# Patient Record
Sex: FEMALE | Race: OTHER
[De-identification: names, ages, dates, MRNs, and addresses within clinical notes are randomized per-mention and may not be internally consistent; named-entity substitution may affect disease eponyms.]

---

## 2017-08-22 ENCOUNTER — APPOINTMENT (OUTPATIENT)
Dept: ANTEPARTUM | Facility: CLINIC | Age: 27
End: 2017-08-22

## 2017-08-22 PROBLEM — Z00.00 ENCOUNTER FOR PREVENTIVE HEALTH EXAMINATION: Status: ACTIVE | Noted: 2017-08-22

## 2019-04-03 ENCOUNTER — ASOB RESULT (OUTPATIENT)
Age: 29
End: 2019-04-03

## 2019-04-03 ENCOUNTER — APPOINTMENT (OUTPATIENT)
Age: 29
End: 2019-04-03
Payer: COMMERCIAL

## 2019-04-03 PROCEDURE — 76830 TRANSVAGINAL US NON-OB: CPT

## 2019-04-03 PROCEDURE — 76857 US EXAM PELVIC LIMITED: CPT | Mod: 59

## 2021-01-19 ENCOUNTER — ASOB RESULT (OUTPATIENT)
Age: 31
End: 2021-01-19

## 2021-01-19 ENCOUNTER — APPOINTMENT (OUTPATIENT)
Dept: ANTEPARTUM | Facility: CLINIC | Age: 31
End: 2021-01-19
Payer: COMMERCIAL

## 2021-01-19 PROCEDURE — 76856 US EXAM PELVIC COMPLETE: CPT | Mod: 59

## 2021-01-19 PROCEDURE — 99072 ADDL SUPL MATRL&STAF TM PHE: CPT

## 2021-01-19 PROCEDURE — 76830 TRANSVAGINAL US NON-OB: CPT

## 2021-03-09 ENCOUNTER — APPOINTMENT (OUTPATIENT)
Dept: ANTEPARTUM | Facility: CLINIC | Age: 31
End: 2021-03-09
Payer: COMMERCIAL

## 2021-03-09 ENCOUNTER — ASOB RESULT (OUTPATIENT)
Age: 31
End: 2021-03-09

## 2021-03-09 PROCEDURE — 76856 US EXAM PELVIC COMPLETE: CPT | Mod: 59

## 2021-03-09 PROCEDURE — 99072 ADDL SUPL MATRL&STAF TM PHE: CPT

## 2021-03-09 PROCEDURE — 76830 TRANSVAGINAL US NON-OB: CPT

## 2021-08-12 ENCOUNTER — EMERGENCY (EMERGENCY)
Facility: HOSPITAL | Age: 31
LOS: 0 days | Discharge: HOME | End: 2021-08-12
Attending: HOSPITALIST
Payer: COMMERCIAL

## 2021-08-12 VITALS
HEART RATE: 84 BPM | RESPIRATION RATE: 20 BRPM | OXYGEN SATURATION: 98 % | TEMPERATURE: 99 F | SYSTOLIC BLOOD PRESSURE: 118 MMHG | DIASTOLIC BLOOD PRESSURE: 68 MMHG

## 2021-08-12 VITALS
SYSTOLIC BLOOD PRESSURE: 120 MMHG | DIASTOLIC BLOOD PRESSURE: 72 MMHG | HEART RATE: 84 BPM | OXYGEN SATURATION: 97 % | RESPIRATION RATE: 18 BRPM | TEMPERATURE: 99 F

## 2021-08-12 DIAGNOSIS — Z88.8 ALLERGY STATUS TO OTHER DRUGS, MEDICAMENTS AND BIOLOGICAL SUBSTANCES STATUS: ICD-10-CM

## 2021-08-12 DIAGNOSIS — O20.9 HEMORRHAGE IN EARLY PREGNANCY, UNSPECIFIED: ICD-10-CM

## 2021-08-12 DIAGNOSIS — Z3A.08 8 WEEKS GESTATION OF PREGNANCY: ICD-10-CM

## 2021-08-12 DIAGNOSIS — O20.0 THREATENED ABORTION: ICD-10-CM

## 2021-08-12 LAB
ALBUMIN SERPL ELPH-MCNC: 3 G/DL — LOW (ref 3.3–5)
ALP SERPL-CCNC: 53 U/L — SIGNIFICANT CHANGE UP (ref 40–120)
ALT FLD-CCNC: 19 U/L — SIGNIFICANT CHANGE UP (ref 12–78)
ANION GAP SERPL CALC-SCNC: 6 MMOL/L — SIGNIFICANT CHANGE UP (ref 5–17)
APPEARANCE UR: CLEAR — SIGNIFICANT CHANGE UP
AST SERPL-CCNC: 18 U/L — SIGNIFICANT CHANGE UP (ref 15–37)
BASOPHILS # BLD AUTO: 0.05 K/UL — SIGNIFICANT CHANGE UP (ref 0–0.2)
BASOPHILS NFR BLD AUTO: 0.4 % — SIGNIFICANT CHANGE UP (ref 0–2)
BILIRUB SERPL-MCNC: 0.2 MG/DL — SIGNIFICANT CHANGE UP (ref 0.2–1.2)
BILIRUB UR-MCNC: NEGATIVE — SIGNIFICANT CHANGE UP
BUN SERPL-MCNC: 10 MG/DL — SIGNIFICANT CHANGE UP (ref 7–23)
CALCIUM SERPL-MCNC: 8.9 MG/DL — SIGNIFICANT CHANGE UP (ref 8.5–10.1)
CHLORIDE SERPL-SCNC: 107 MMOL/L — SIGNIFICANT CHANGE UP (ref 96–108)
CO2 SERPL-SCNC: 23 MMOL/L — SIGNIFICANT CHANGE UP (ref 22–31)
COLOR SPEC: YELLOW — SIGNIFICANT CHANGE UP
CREAT SERPL-MCNC: 0.59 MG/DL — SIGNIFICANT CHANGE UP (ref 0.5–1.3)
DIFF PNL FLD: ABNORMAL
EOSINOPHIL # BLD AUTO: 0.12 K/UL — SIGNIFICANT CHANGE UP (ref 0–0.5)
EOSINOPHIL NFR BLD AUTO: 1 % — SIGNIFICANT CHANGE UP (ref 0–6)
GLUCOSE SERPL-MCNC: 83 MG/DL — SIGNIFICANT CHANGE UP (ref 70–99)
GLUCOSE UR QL: NEGATIVE MG/DL — SIGNIFICANT CHANGE UP
HCG SERPL-ACNC: HIGH MIU/ML
HCT VFR BLD CALC: 39.4 % — SIGNIFICANT CHANGE UP (ref 34.5–45)
HGB BLD-MCNC: 12.8 G/DL — SIGNIFICANT CHANGE UP (ref 11.5–15.5)
IMM GRANULOCYTES NFR BLD AUTO: 0.6 % — SIGNIFICANT CHANGE UP (ref 0–1.5)
KETONES UR-MCNC: NEGATIVE — SIGNIFICANT CHANGE UP
LEUKOCYTE ESTERASE UR-ACNC: ABNORMAL
LIDOCAIN IGE QN: 163 U/L — SIGNIFICANT CHANGE UP (ref 73–393)
LYMPHOCYTES # BLD AUTO: 2.75 K/UL — SIGNIFICANT CHANGE UP (ref 1–3.3)
LYMPHOCYTES # BLD AUTO: 22.7 % — SIGNIFICANT CHANGE UP (ref 13–44)
MCHC RBC-ENTMCNC: 28.1 PG — SIGNIFICANT CHANGE UP (ref 27–34)
MCHC RBC-ENTMCNC: 32.5 GM/DL — SIGNIFICANT CHANGE UP (ref 32–36)
MCV RBC AUTO: 86.6 FL — SIGNIFICANT CHANGE UP (ref 80–100)
MONOCYTES # BLD AUTO: 1.04 K/UL — HIGH (ref 0–0.9)
MONOCYTES NFR BLD AUTO: 8.6 % — SIGNIFICANT CHANGE UP (ref 2–14)
NEUTROPHILS # BLD AUTO: 8.1 K/UL — HIGH (ref 1.8–7.4)
NEUTROPHILS NFR BLD AUTO: 66.7 % — SIGNIFICANT CHANGE UP (ref 43–77)
NITRITE UR-MCNC: NEGATIVE — SIGNIFICANT CHANGE UP
PH UR: 6 — SIGNIFICANT CHANGE UP (ref 5–8)
PLATELET # BLD AUTO: 357 K/UL — SIGNIFICANT CHANGE UP (ref 150–400)
POTASSIUM SERPL-MCNC: 4 MMOL/L — SIGNIFICANT CHANGE UP (ref 3.5–5.3)
POTASSIUM SERPL-SCNC: 4 MMOL/L — SIGNIFICANT CHANGE UP (ref 3.5–5.3)
PROT SERPL-MCNC: 7 GM/DL — SIGNIFICANT CHANGE UP (ref 6–8.3)
PROT UR-MCNC: NEGATIVE MG/DL — SIGNIFICANT CHANGE UP
RBC # BLD: 4.55 M/UL — SIGNIFICANT CHANGE UP (ref 3.8–5.2)
RBC # FLD: 12.2 % — SIGNIFICANT CHANGE UP (ref 10.3–14.5)
SODIUM SERPL-SCNC: 136 MMOL/L — SIGNIFICANT CHANGE UP (ref 135–145)
SP GR SPEC: 1 — LOW (ref 1.01–1.02)
UROBILINOGEN FLD QL: NEGATIVE MG/DL — SIGNIFICANT CHANGE UP
WBC # BLD: 12.13 K/UL — HIGH (ref 3.8–10.5)
WBC # FLD AUTO: 12.13 K/UL — HIGH (ref 3.8–10.5)

## 2021-08-12 PROCEDURE — 76817 TRANSVAGINAL US OBSTETRIC: CPT | Mod: 26

## 2021-08-12 PROCEDURE — 36415 COLL VENOUS BLD VENIPUNCTURE: CPT

## 2021-08-12 PROCEDURE — 87086 URINE CULTURE/COLONY COUNT: CPT

## 2021-08-12 PROCEDURE — 99285 EMERGENCY DEPT VISIT HI MDM: CPT

## 2021-08-12 PROCEDURE — 86901 BLOOD TYPING SEROLOGIC RH(D): CPT

## 2021-08-12 PROCEDURE — 84702 CHORIONIC GONADOTROPIN TEST: CPT

## 2021-08-12 PROCEDURE — 99284 EMERGENCY DEPT VISIT MOD MDM: CPT | Mod: 25

## 2021-08-12 PROCEDURE — 85025 COMPLETE CBC W/AUTO DIFF WBC: CPT

## 2021-08-12 PROCEDURE — 81001 URINALYSIS AUTO W/SCOPE: CPT

## 2021-08-12 PROCEDURE — 86900 BLOOD TYPING SEROLOGIC ABO: CPT

## 2021-08-12 PROCEDURE — 83690 ASSAY OF LIPASE: CPT

## 2021-08-12 PROCEDURE — 86850 RBC ANTIBODY SCREEN: CPT

## 2021-08-12 PROCEDURE — 76817 TRANSVAGINAL US OBSTETRIC: CPT

## 2021-08-12 PROCEDURE — 80053 COMPREHEN METABOLIC PANEL: CPT

## 2021-08-12 RX ORDER — SODIUM CHLORIDE 9 MG/ML
1000 INJECTION INTRAMUSCULAR; INTRAVENOUS; SUBCUTANEOUS ONCE
Refills: 0 | Status: COMPLETED | OUTPATIENT
Start: 2021-08-12 | End: 2021-08-12

## 2021-08-12 RX ADMIN — SODIUM CHLORIDE 1000 MILLILITER(S): 9 INJECTION INTRAMUSCULAR; INTRAVENOUS; SUBCUTANEOUS at 21:14

## 2021-08-12 NOTE — ED ADULT NURSE NOTE - OBJECTIVE STATEMENT
Patient currently 8 weeks pregnant presents to the ED for evaluation of vaginal bleeding which started as spotting last week (pink). Pt states it was very minimal and saw her OBGYN who said it was fine, confirmed IUP. States now, color is bright pink and is bleeding more than before, was advised by Dr. Jose Luis Koehler (OBGYN) to be evaluated in ED. Pt also endorses abdominal cramping and lower back pain that began at about 12pm today. Denies dysuria. is taking prenatal vitamins with Folic acid. No previous pregnancies.

## 2021-08-12 NOTE — ED STATDOCS - PATIENT PORTAL LINK FT
You can access the FollowMyHealth Patient Portal offered by [facility] by registering at the following website: [email or web address]. By joining [employer]’s FollowMyHealth portal, you will also be able to view your health information using other applications (apps) compatible with our system.

## 2021-08-12 NOTE — ED STATDOCS - NS_ ATTENDINGSCRIBEDETAILS _ED_A_ED_FT
Vikki Diaz MD: The history, relevant review of systems, past medical and surgical history, medical decision making, and physical examination was documented by the scribe in my presence and I attest to the accuracy of the documentation.

## 2021-08-12 NOTE — ED ADULT TRIAGE NOTE - CHIEF COMPLAINT QUOTE
pt presents to ED s/p vaginal bleeding, cramping and back pain that began at 12pm today. Endorses light pink discharge. Currently 8 weeks pregnant was advised by Dr. Koehler(OBGYN) to be evaluated in ED.

## 2021-08-12 NOTE — ED STATDOCS - ENMT, MLM
"Chief Complaint   Patient presents with     Urgent Care     Follow up - 8/1/2017 - [facility]  Physicians = Lower back pain     Back Pain     Follow up Urgent Care visit       Initial /72 (BP Location: Right arm, Patient Position: Chair, Cuff Size: Adult Large)  Pulse 60  Temp 97.9  F (36.6  C) (Oral)  Wt 204 lb (92.5 kg)  SpO2 99%  Breastfeeding? No  BMI 33.95 kg/m2 Estimated body mass index is 33.95 kg/(m^2) as calculated from the following:    Height as of 12/23/16: 5' 5\" (1.651 m).    Weight as of this encounter: 204 lb (92.5 kg).  Medication Reconciliation: complete   An,CMA (AMAA)      " Nasal mucosa clear.  Mouth with normal mucosa  Throat has no vesicles, no oropharyngeal exudates and uvula is midline.

## 2021-08-12 NOTE — ED STATDOCS - PROGRESS NOTE DETAILS
Pt. is a 31 year old female [id number] with vaginal bleeding.  Pt. stated it started as spotting last week, pink in color.  Very small amount last week.  PT. saw her OB (Dr. Capps) ajd said she was fine.  IUP confirmed via ultrasound.  Color is now bright pink and heavier bleeding.  OB sent in for evaluation.  Pt. with cramping and lower back pain.  Debra Willoughby PA-C Pt. aware of US report and she has appointment with her OB tomorrow.  Debra Willoughby PA-C Pt. is a 31 year old female [id number] with vaginal bleeding.  Pt. stated it started as spotting last week, pink in color.  Very small amount last week.  PT. saw her OB (Dr. Capps) and said she was fine.  IUP confirmed via ultrasound.  Color is now bright pink and heavier bleeding.  OB sent in for evaluation.  Pt. with cramping and lower back pain.  Debra Willoughby PA-C

## 2021-08-12 NOTE — ED STATDOCS - OBJECTIVE STATEMENT
30 y/o female, currently 8 weeks pregnant presents to the ED for evaluation of vaginal bleeding which started as spotting last week (pink). Pt states it was very minimal and saw her OBGYN who said it was fine, confirmed IUP. States now, color is bright pink and is bleeding more than before, was advised by Dr. Jose Luis Koehler (OBGYN) to be evaluated in ED. Pt also endorses abdominal cramping and lower back pain that began at about 12pm today. Denies dysuria. is taking prenatal vitamins with Folic acid. No previous pregnancies.

## 2021-08-14 LAB
CULTURE RESULTS: SIGNIFICANT CHANGE UP
SPECIMEN SOURCE: SIGNIFICANT CHANGE UP

## 2022-11-09 ENCOUNTER — OFFICE (OUTPATIENT)
Dept: URBAN - METROPOLITAN AREA CLINIC 112 | Facility: CLINIC | Age: 32
Setting detail: OPHTHALMOLOGY
End: 2022-11-09
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
## 2022-11-09 PROCEDURE — NO SHOW FE NO SHOW FEE: Performed by: OPHTHALMOLOGY

## 2023-04-12 ENCOUNTER — OFFICE (OUTPATIENT)
Dept: URBAN - METROPOLITAN AREA CLINIC 112 | Facility: CLINIC | Age: 33
Setting detail: OPHTHALMOLOGY
End: 2023-04-12
Payer: COMMERCIAL

## 2023-04-12 DIAGNOSIS — G51.0: ICD-10-CM

## 2023-04-12 DIAGNOSIS — H16.223: ICD-10-CM

## 2023-04-12 DIAGNOSIS — H01.015: ICD-10-CM

## 2023-04-12 DIAGNOSIS — H01.012: ICD-10-CM

## 2023-04-12 DIAGNOSIS — H52.13: ICD-10-CM

## 2023-04-12 DIAGNOSIS — H40.013: ICD-10-CM

## 2023-04-12 DIAGNOSIS — H16.222: ICD-10-CM

## 2023-04-12 DIAGNOSIS — H16.221: ICD-10-CM

## 2023-04-12 PROCEDURE — 92250 FUNDUS PHOTOGRAPHY W/I&R: CPT | Performed by: OPHTHALMOLOGY

## 2023-04-12 PROCEDURE — 92015 DETERMINE REFRACTIVE STATE: CPT | Performed by: OPHTHALMOLOGY

## 2023-04-12 PROCEDURE — 92014 COMPRE OPH EXAM EST PT 1/>: CPT | Performed by: OPHTHALMOLOGY

## 2023-04-12 PROCEDURE — 92083 EXTENDED VISUAL FIELD XM: CPT | Performed by: OPHTHALMOLOGY

## 2023-04-12 ASSESSMENT — CONFRONTATIONAL VISUAL FIELD TEST (CVF)
OS_FINDINGS: FULL
OD_FINDINGS: FULL

## 2023-04-12 ASSESSMENT — REFRACTION_MANIFEST
OS_AXIS: 102
OD_VA1: 20/20
OD_VA1: 20/20
OS_VA1: 20/20
OS_SPHERE: -0.75
OS_CYLINDER: -0.25
OD_SPHERE: -0.75
OD_AXIS: 180
OD_SPHERE: -0.25
OD_SPHERE: -1.00
OD_AXIS: 066
OD_SPHERE: -2.50
OD_CYLINDER: -0.50
OS_SPHERE: -1.50
OD_CYLINDER: SPHERE
OS_VA1: 20/20
OS_SPHERE: -1.50
OD_VA1: 20/20
OD_CYLINDER: -0.25

## 2023-04-12 ASSESSMENT — REFRACTION_CURRENTRX
OD_CYLINDER: SPH
OS_CYLINDER: SPH
OS_OVR_VA: 20/
OS_SPHERE: -1.75
OD_SPHERE: -1.25
OS_VPRISM_DIRECTION: SV
OD_OVR_VA: 20/
OD_VPRISM_DIRECTION: SV

## 2023-04-12 ASSESSMENT — AXIALLENGTH_DERIVED
OD_AL: 24.7249
OS_AL: 24.1999
OS_AL: 24.0979
OD_AL: 24.4603
OD_AL: 24.7785

## 2023-04-12 ASSESSMENT — KERATOMETRY
OS_AXISANGLE_DEGREES: 100
OD_K1POWER_DIOPTERS: 41.00
OD_AXISANGLE_DEGREES: 068
METHOD_AUTO_MANUAL: AUTO
OS_K1POWER_DIOPTERS: 43.25
OS_K2POWER_DIOPTERS: 44.50
OD_K2POWER_DIOPTERS: 42.50

## 2023-04-12 ASSESSMENT — REFRACTION_AUTOREFRACTION
OD_SPHERE: -1.00
OD_AXIS: 151
OS_CYLINDER: -0.25
OS_SPHERE: -1.75
OD_CYLINDER: -0.50
OS_AXIS: 146

## 2023-04-12 ASSESSMENT — SPHEQUIV_DERIVED
OD_SPHEQUIV: -1.125
OS_SPHEQUIV: -1.875
OS_SPHEQUIV: -1.625
OD_SPHEQUIV: -0.5
OD_SPHEQUIV: -1.25

## 2023-04-12 ASSESSMENT — SUPERFICIAL PUNCTATE KERATITIS (SPK)
OD_SPK: T 1+
OS_SPK: T 1+

## 2023-04-12 ASSESSMENT — TONOMETRY
OD_IOP_MMHG: 15
OS_IOP_MMHG: 20

## 2023-04-12 ASSESSMENT — VISUAL ACUITY
OD_BCVA: 20/20
OS_BCVA: 20/20

## 2023-04-12 ASSESSMENT — LID EXAM ASSESSMENTS
OD_BLEPHARITIS: RLL T
OS_BLEPHARITIS: LLL T

## 2023-10-11 ENCOUNTER — OFFICE (OUTPATIENT)
Dept: URBAN - METROPOLITAN AREA CLINIC 112 | Facility: CLINIC | Age: 33
Setting detail: OPHTHALMOLOGY
End: 2023-10-11
Payer: COMMERCIAL

## 2023-10-11 DIAGNOSIS — H01.012: ICD-10-CM

## 2023-10-11 DIAGNOSIS — G51.0: ICD-10-CM

## 2023-10-11 DIAGNOSIS — H16.223: ICD-10-CM

## 2023-10-11 DIAGNOSIS — H01.015: ICD-10-CM

## 2023-10-11 DIAGNOSIS — H40.013: ICD-10-CM

## 2023-10-11 DIAGNOSIS — H16.221: ICD-10-CM

## 2023-10-11 DIAGNOSIS — H16.222: ICD-10-CM

## 2023-10-11 PROCEDURE — 99213 OFFICE O/P EST LOW 20 MIN: CPT | Performed by: OPHTHALMOLOGY

## 2023-10-11 PROCEDURE — 92133 CPTRZD OPH DX IMG PST SGM ON: CPT | Performed by: OPHTHALMOLOGY

## 2023-10-11 ASSESSMENT — REFRACTION_CURRENTRX
OS_VPRISM_DIRECTION: SV
OD_SPHERE: -1.25
OS_OVR_VA: 20/
OD_VPRISM_DIRECTION: SV
OD_OVR_VA: 20/
OS_CYLINDER: SPH
OD_CYLINDER: SPH
OS_SPHERE: -1.75

## 2023-10-11 ASSESSMENT — REFRACTION_MANIFEST
OD_AXIS: 180
OD_CYLINDER: -0.25
OD_SPHERE: -0.75
OD_SPHERE: -0.25
OD_VA1: 20/20
OD_VA1: 20/20
OD_CYLINDER: SPHERE
OS_VA1: 20/20
OD_SPHERE: -2.50
OS_SPHERE: -1.50
OD_VA1: 20/20
OS_CYLINDER: -0.25
OS_AXIS: 102
OS_SPHERE: -1.50
OS_SPHERE: -0.75
OD_AXIS: 066
OD_CYLINDER: -0.50
OD_SPHERE: -1.00
OS_VA1: 20/20

## 2023-10-11 ASSESSMENT — KERATOMETRY
OS_AXISANGLE_DEGREES: 100
OD_K1POWER_DIOPTERS: 41.00
OS_K2POWER_DIOPTERS: 44.50
OD_AXISANGLE_DEGREES: 068
METHOD_AUTO_MANUAL: AUTO
OD_K2POWER_DIOPTERS: 42.50
OS_K1POWER_DIOPTERS: 43.25

## 2023-10-11 ASSESSMENT — VISUAL ACUITY
OD_BCVA: 20/20
OS_BCVA: 20/20

## 2023-10-11 ASSESSMENT — AXIALLENGTH_DERIVED
OS_AL: 24.0979
OD_AL: 24.4603
OD_AL: 24.7785
OD_AL: 24.7249
OS_AL: 24.1999

## 2023-10-11 ASSESSMENT — SPHEQUIV_DERIVED
OS_SPHEQUIV: -1.875
OD_SPHEQUIV: -1.25
OS_SPHEQUIV: -1.625
OD_SPHEQUIV: -1.125
OD_SPHEQUIV: -0.5

## 2023-10-11 ASSESSMENT — SUPERFICIAL PUNCTATE KERATITIS (SPK)
OD_SPK: T
OS_SPK: T

## 2023-10-11 ASSESSMENT — TONOMETRY
OD_IOP_MMHG: 16
OS_IOP_MMHG: 16

## 2023-10-11 ASSESSMENT — LID EXAM ASSESSMENTS
OD_BLEPHARITIS: RLL T
OS_BLEPHARITIS: LLL T

## 2023-10-11 ASSESSMENT — CONFRONTATIONAL VISUAL FIELD TEST (CVF)
OD_FINDINGS: FULL
OS_FINDINGS: FULL

## 2023-10-11 ASSESSMENT — REFRACTION_AUTOREFRACTION
OS_CYLINDER: -0.25
OD_SPHERE: -1.00
OS_AXIS: 146
OS_SPHERE: -1.75
OD_AXIS: 151
OD_CYLINDER: -0.50

## 2024-01-03 ENCOUNTER — EMERGENCY (EMERGENCY)
Facility: HOSPITAL | Age: 34
LOS: 0 days | Discharge: HOME | End: 2024-01-03
Attending: EMERGENCY MEDICINE
Payer: COMMERCIAL

## 2024-01-03 VITALS — WEIGHT: 279.99 LBS | HEIGHT: 63 IN

## 2024-01-03 VITALS
HEART RATE: 80 BPM | DIASTOLIC BLOOD PRESSURE: 73 MMHG | OXYGEN SATURATION: 99 % | TEMPERATURE: 99 F | SYSTOLIC BLOOD PRESSURE: 117 MMHG | RESPIRATION RATE: 18 BRPM

## 2024-01-03 DIAGNOSIS — S46.912A STRAIN OF UNSPECIFIED MUSCLE, FASCIA AND TENDON AT SHOULDER AND UPPER ARM LEVEL, LEFT ARM, INITIAL ENCOUNTER: ICD-10-CM
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
## 2024-01-03 DIAGNOSIS — M25.552 PAIN IN LEFT HIP: ICD-10-CM

## 2024-01-03 DIAGNOSIS — S09.90XA UNSPECIFIED INJURY OF HEAD, INITIAL ENCOUNTER: ICD-10-CM

## 2024-01-03 DIAGNOSIS — M54.6 PAIN IN THORACIC SPINE: ICD-10-CM

## 2024-01-03 DIAGNOSIS — Z88.3 ALLERGY STATUS TO OTHER ANTI-INFECTIVE AGENTS: ICD-10-CM
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
## 2024-01-03 DIAGNOSIS — S70.02XA CONTUSION OF LEFT HIP, INITIAL ENCOUNTER: ICD-10-CM

## 2024-01-03 PROCEDURE — 73502 X-RAY EXAM HIP UNI 2-3 VIEWS: CPT | Mod: 26,LT

## 2024-01-03 PROCEDURE — 73502 X-RAY EXAM HIP UNI 2-3 VIEWS: CPT | Mod: LT

## 2024-01-03 PROCEDURE — 73030 X-RAY EXAM OF SHOULDER: CPT | Mod: 26,LT

## 2024-01-03 PROCEDURE — 99284 EMERGENCY DEPT VISIT MOD MDM: CPT | Mod: 25

## 2024-01-03 PROCEDURE — 99284 EMERGENCY DEPT VISIT MOD MDM: CPT

## 2024-01-03 PROCEDURE — 73030 X-RAY EXAM OF SHOULDER: CPT | Mod: LT

## 2024-01-03 RX ORDER — IBUPROFEN 200 MG
800 TABLET ORAL ONCE
Refills: 0 | Status: COMPLETED | OUTPATIENT
Start: 2024-01-03 | End: 2024-01-03

## 2024-01-03 RX ADMIN — Medication 800 MILLIGRAM(S): at 21:46

## 2024-01-03 NOTE — ED STATDOCS - PATIENT PORTAL LINK FT
You can access the FollowMyHealth Patient Portal offered by [facility] by registering at the following website: [email or web address]. By joining [employer]’s FollowMyHealth portal, you will also be able to view your health information using other applications (apps) compatible with our system. You can access the FollowMyHealth Patient Portal offered by [facility] by registering at the following website: [email or web address]. By joining [employer]’s FollowMyHealth portal, you will also be able to view your health information using other applications (apps) compatible with our system.
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
## 2024-01-03 NOTE — ED STATDOCS - MUSCULOSKELETAL, MLM
range of motion is not limited, tenderness to L side of occipitis to head, tenderness L anterior shoulder, tenderness to L gluteus maryuri muscle

## 2024-01-03 NOTE — ED STATDOCS - WR INTERPRETATION DATE TIME  1
1. Have you been to the ER, urgent care clinic since your last visit? Hospitalized since your last visit? No    2. Have you seen or consulted any other health care providers outside of the [address] [place] [address] since your last visit? Include any pap smears or colon screening.  Yes, psychiatry    Chief Complaint   Patient presents with    Labs     Patient requests order for CBC with diff/plt, CMP, and lipid panel as requested by psychiatry       Visit Vitals  /76 (BP 1 Location: Left upper arm, BP Patient Position: Sitting, BP Cuff Size: Adult)   Pulse 77   Temp 98.2 °F (36.8 °C) (Skin)   Wt 164 lb (74.4 kg)   SpO2 96%   BMI 24.94 kg/m² 03-Jan-2024 21:17
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
## 2024-01-03 NOTE — ED STATDOCS - CARE PROVIDER_API CALL
Rayshawn Becker  Internal Medicine  [address] [place] [address]  Phone: [phone]  Fax: [phone]  Follow Up Time:    Rayshawn Becker  Internal Medicine  [address] [place] [address]  Phone: [phone]  Fax: [phone]  Follow Up Time:

## 2024-01-04 PROBLEM — Z78.9 OTHER SPECIFIED HEALTH STATUS: Chronic | Status: ACTIVE | Noted: 2021-08-16

## 2024-04-10 ENCOUNTER — OFFICE (OUTPATIENT)
Dept: URBAN - METROPOLITAN AREA CLINIC 112 | Facility: CLINIC | Age: 34
Setting detail: OPHTHALMOLOGY
End: 2024-04-10
Payer: COMMERCIAL

## 2024-04-10 DIAGNOSIS — G51.0: ICD-10-CM

## 2024-04-10 DIAGNOSIS — H40.013: ICD-10-CM

## 2024-04-10 DIAGNOSIS — H16.223: ICD-10-CM

## 2024-04-10 DIAGNOSIS — H16.222: ICD-10-CM

## 2024-04-10 DIAGNOSIS — H01.012: ICD-10-CM

## 2024-04-10 DIAGNOSIS — H01.015: ICD-10-CM

## 2024-04-10 PROCEDURE — 92250 FUNDUS PHOTOGRAPHY W/I&R: CPT | Performed by: OPHTHALMOLOGY

## 2024-04-10 PROCEDURE — 92014 COMPRE OPH EXAM EST PT 1/>: CPT | Performed by: OPHTHALMOLOGY

## 2024-04-10 PROCEDURE — 83861 MICROFLUID ANALY TEARS: CPT | Mod: QW | Performed by: OPHTHALMOLOGY

## 2024-04-10 PROCEDURE — 92083 EXTENDED VISUAL FIELD XM: CPT | Performed by: OPHTHALMOLOGY

## 2024-04-10 PROCEDURE — 83861 MICROFLUID ANALY TEARS: CPT | Mod: QW,LT | Performed by: OPHTHALMOLOGY

## 2024-04-10 ASSESSMENT — LID EXAM ASSESSMENTS
OD_BLEPHARITIS: RLL T
OS_BLEPHARITIS: LLL T

## 2024-07-24 NOTE — ED ADULT NURSE NOTE - TEMPLATE
Kidney function is Chronic  Kidney Disease Stage 4  Renal function stable with creatinine 1.78 mg/dl, GFR 29     CHANGES:  POA paperwork    Restart ferrous sulfate 325 (65 FE) every other day -- or try gentle iron daily       Follow up in 6 months (January, 2025), with non-fasting lab work one week prior to the appointment. We will go over results at that appointment. Labs to be drawn prior to appointment-- basic metabolic panel, cystatin C, albumin, phosphorus, magnesium, 25 hydroxy vitamin D, intact parathyroid hormone, Hgb/Hct, urine protein/creatinine, urine microalbumin/creat     We will contact you by phone or mail when we receive the schedule.    Remember to drink adequately, mostly water, and aim for urine to be a pale yellow  Aim for low sodium diet     Take Tylenol/Acetaminophen based products for fever/pain  Avoid NSAIDs which are bad for the kidney: Ibuprofen, Aleve, Advil, Aspirin     If any issues or questions should happen before your next appointment, do not hesitate to call. Our office number is [phone]       We appreciate you coming in today   Please consider completing patient survey if you receive one for today's visit     General

## 2024-10-09 ENCOUNTER — OFFICE (OUTPATIENT)
Dept: URBAN - METROPOLITAN AREA CLINIC 112 | Facility: CLINIC | Age: 34
Setting detail: OPHTHALMOLOGY
End: 2024-10-09
Payer: COMMERCIAL

## 2024-10-09 DIAGNOSIS — H01.015: ICD-10-CM

## 2024-10-09 DIAGNOSIS — H40.013: ICD-10-CM

## 2024-10-09 DIAGNOSIS — H16.223: ICD-10-CM

## 2024-10-09 DIAGNOSIS — H16.222: ICD-10-CM

## 2024-10-09 DIAGNOSIS — G51.0: ICD-10-CM

## 2024-10-09 DIAGNOSIS — H01.012: ICD-10-CM

## 2024-10-09 DIAGNOSIS — H16.221: ICD-10-CM

## 2024-10-09 PROCEDURE — 83861 MICROFLUID ANALY TEARS: CPT | Mod: QW,LT | Performed by: OPHTHALMOLOGY

## 2024-10-09 PROCEDURE — 92014 COMPRE OPH EXAM EST PT 1/>: CPT | Performed by: OPHTHALMOLOGY

## 2024-10-09 PROCEDURE — 92133 CPTRZD OPH DX IMG PST SGM ON: CPT | Performed by: OPHTHALMOLOGY

## 2024-10-09 PROCEDURE — 83861 MICROFLUID ANALY TEARS: CPT | Mod: QW | Performed by: OPHTHALMOLOGY

## 2024-10-09 ASSESSMENT — REFRACTION_MANIFEST
OD_AXIS: 180
OS_SPHERE: -1.50
OS_VA1: 20/20
OS_SPHERE: -1.50
OS_VA1: 20/20
OD_VA1: 20/20
OS_SPHERE: -0.75
OD_AXIS: 066
OD_CYLINDER: -0.50
OD_SPHERE: -1.00
OD_CYLINDER: -0.25
OD_SPHERE: -2.50
OD_VA1: 20/20
OD_VA1: 20/20
OD_SPHERE: -0.75
OD_CYLINDER: SPHERE
OS_CYLINDER: -0.25
OD_SPHERE: -0.25
OS_AXIS: 102

## 2024-10-09 ASSESSMENT — REFRACTION_CURRENTRX
OD_SPHERE: -1.25
OD_CYLINDER: SPH
OD_OVR_VA: 20/
OS_OVR_VA: 20/
OS_CYLINDER: SPH
OS_SPHERE: -1.75
OD_VPRISM_DIRECTION: SV
OS_VPRISM_DIRECTION: SV

## 2024-10-09 ASSESSMENT — KERATOMETRY
OS_K1POWER_DIOPTERS: 43.00
OS_AXISANGLE_DEGREES: 106
OS_K2POWER_DIOPTERS: 44.25
OD_K2POWER_DIOPTERS: 42.50
OD_K1POWER_DIOPTERS: 41.00
METHOD_AUTO_MANUAL: AUTO
OD_AXISANGLE_DEGREES: 072

## 2024-10-09 ASSESSMENT — REFRACTION_AUTOREFRACTION
OS_CYLINDER: -0.25
OD_CYLINDER: -0.50
OS_SPHERE: -1.00
OS_AXIS: 156
OD_SPHERE: -0.75
OD_AXIS: 158

## 2024-10-09 ASSESSMENT — CONFRONTATIONAL VISUAL FIELD TEST (CVF)
OS_FINDINGS: FULL
OD_FINDINGS: FULL

## 2024-10-09 ASSESSMENT — TONOMETRY
OS_IOP_MMHG: 20
OD_IOP_MMHG: 15

## 2024-10-09 ASSESSMENT — SUPERFICIAL PUNCTATE KERATITIS (SPK)
OS_SPK: T
OD_SPK: T

## 2024-10-09 ASSESSMENT — LID EXAM ASSESSMENTS
OS_BLEPHARITIS: LLL T
OD_BLEPHARITIS: RLL T

## 2024-10-09 ASSESSMENT — VISUAL ACUITY
OS_BCVA: 20/20
OD_BCVA: 20/20

## 2025-04-30 ENCOUNTER — OFFICE (OUTPATIENT)
Dept: URBAN - METROPOLITAN AREA CLINIC 112 | Facility: CLINIC | Age: 35
Setting detail: OPHTHALMOLOGY
End: 2025-04-30
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
## 2025-04-30 PROCEDURE — NO SHOW FE NO SHOW FEE: Performed by: OPHTHALMOLOGY
